# Patient Record
Sex: FEMALE | Race: WHITE
[De-identification: names, ages, dates, MRNs, and addresses within clinical notes are randomized per-mention and may not be internally consistent; named-entity substitution may affect disease eponyms.]

---

## 2016-12-17 VITALS
DIASTOLIC BLOOD PRESSURE: 57 MMHG | DIASTOLIC BLOOD PRESSURE: 57 MMHG | DIASTOLIC BLOOD PRESSURE: 57 MMHG | SYSTOLIC BLOOD PRESSURE: 111 MMHG | SYSTOLIC BLOOD PRESSURE: 111 MMHG | SYSTOLIC BLOOD PRESSURE: 111 MMHG

## 2017-03-24 ENCOUNTER — HOSPITAL ENCOUNTER (OUTPATIENT)
Dept: HOSPITAL 63 - MAMMO | Age: 57
Discharge: HOME | End: 2017-03-24
Payer: COMMERCIAL

## 2017-03-24 DIAGNOSIS — Z12.31: Primary | ICD-10-CM

## 2017-03-24 DIAGNOSIS — Z01.419: ICD-10-CM

## 2017-03-24 PROCEDURE — 77063 BREAST TOMOSYNTHESIS BI: CPT

## 2017-03-24 NOTE — RAD
DATE: 3/24/2017



EXAM: MAMMO UVALDO SCREENING BILATERAL



HISTORY: Screening. The history of breast reduction surgery is noted



COMPARISON: Digitized film screening examination from 4 years earlier



This study was interpreted with the benefit of Computerized Aided Detection

(CAD).



FINDINGS:



The breast parenchyma shows scattered fibroglandular densities. Breast

parenchyma level B. 



 There has not been a significant change in the appearance of the breasts.

Occasional benign-appearing calcifications are noted.  







IMPRESSION: Benign findings







BI-RADS CATEGORY: 2 BENIGN FINDING(S)



RECOMMENDED FOLLOW-UP: 12M 12 MONTH FOLLOW-UP



PQRS compliance statement: Patient information was entered into a reminder

system with a target due date 3/24/2018 for the next mammogram.



Mammography is a sensitive method for finding small breast cancers, but it

does not detect them all and is not a substitute for careful clinical

examination.  A negative mammogram does not negate a clinically suspicious

finding and should not result in delay in biopsying a clinically suspicious

abnormality.



"Our facility is accredited by the American College of [employer] Mammography

Program."

## 2017-06-14 ENCOUNTER — HOSPITAL ENCOUNTER (OUTPATIENT)
Dept: HOSPITAL 63 - DXRADRC | Age: 57
Discharge: HOME | End: 2017-06-14
Attending: PHYSICIAN ASSISTANT
Payer: COMMERCIAL

## 2017-06-14 DIAGNOSIS — M79.644: Primary | ICD-10-CM

## 2017-06-14 DIAGNOSIS — M85.841: ICD-10-CM

## 2017-06-14 DIAGNOSIS — M79.89: ICD-10-CM

## 2017-06-14 PROCEDURE — 73140 X-RAY EXAM OF FINGER(S): CPT

## 2017-10-06 ENCOUNTER — HOSPITAL ENCOUNTER (OUTPATIENT)
Dept: HOSPITAL 63 - DXRAD | Age: 57
Discharge: HOME | End: 2017-10-06
Payer: COMMERCIAL

## 2017-10-06 DIAGNOSIS — M05.79: Primary | ICD-10-CM

## 2017-10-06 PROCEDURE — 73630 X-RAY EXAM OF FOOT: CPT

## 2017-10-06 PROCEDURE — 73130 X-RAY EXAM OF HAND: CPT

## 2017-10-06 NOTE — RAD
FOOT BILAT 3V



Clinical Indication: RHEUMATOID ARTHRITIS



Comparison: None.



Findings: 



Right foot: No acute fracture or malalignment. Os perineum. Calcaneal

enthesophyte. The joint spaces are maintained. Bony mineralization is normal

for the patient's age. No significant soft tissue abnormality. No radiopaque

foreign body.



Left foot: No acute fracture or malalignment. Os naviculare versus remote

injury. Os perineum. The joint spaces are maintained. Bony mineralization is

normal for the patient's age. No significant soft tissue abnormality. No

radiopaque foreign body.



IMPRESSION:

No acute fracture or malalignment. The joint spaces are maintained.

## 2017-10-06 NOTE — RAD
HAND BILAT 3V



Clinical Indication: RHEUMATOID ARTHRITIS



Comparison: None.



Findings: 

No acute fracture or malalignment. The joint spaces are maintained. Bony

mineralization is normal for the patient's age. No significant soft tissue

abnormality. No radiopaque foreign body.



IMPRESSION:

No acute fracture or malalignment. The joint spaces are maintained.

## 2017-12-22 NOTE — RAD
Indication: Chest congestion.



Time of exam 1524 hours per



Correlation is made with prior study from 2/1/2017.



Previously noted parenchymal density in the right base has largely resolved.

There is some mild residual density present right mid to lower lung field. The

left lung is clear. No effusion or pneumothorax is seen.



Impression: Residual versus new infiltrate or atelectasis right mid to lower

lung field when compared with exam from 2/1/2017.

## 2018-03-30 ENCOUNTER — HOSPITAL ENCOUNTER (OUTPATIENT)
Dept: HOSPITAL 61 - ENDOS | Age: 58
Discharge: HOME | End: 2018-03-30
Attending: INTERNAL MEDICINE
Payer: COMMERCIAL

## 2018-03-30 VITALS
SYSTOLIC BLOOD PRESSURE: 120 MMHG | DIASTOLIC BLOOD PRESSURE: 62 MMHG | DIASTOLIC BLOOD PRESSURE: 62 MMHG | SYSTOLIC BLOOD PRESSURE: 120 MMHG | DIASTOLIC BLOOD PRESSURE: 62 MMHG | SYSTOLIC BLOOD PRESSURE: 120 MMHG

## 2018-03-30 DIAGNOSIS — Z98.51: ICD-10-CM

## 2018-03-30 DIAGNOSIS — Z98.890: ICD-10-CM

## 2018-03-30 DIAGNOSIS — K57.30: ICD-10-CM

## 2018-03-30 DIAGNOSIS — Z79.899: ICD-10-CM

## 2018-03-30 DIAGNOSIS — Z82.49: ICD-10-CM

## 2018-03-30 DIAGNOSIS — J45.909: ICD-10-CM

## 2018-03-30 DIAGNOSIS — K64.0: ICD-10-CM

## 2018-03-30 DIAGNOSIS — Z12.11: Primary | ICD-10-CM

## 2018-03-30 DIAGNOSIS — E03.9: ICD-10-CM

## 2018-03-30 DIAGNOSIS — K21.9: ICD-10-CM

## 2018-03-30 DIAGNOSIS — Z83.3: ICD-10-CM

## 2018-03-30 PROCEDURE — 45378 DIAGNOSTIC COLONOSCOPY: CPT

## 2018-03-30 RX ADMIN — SODIUM CHLORIDE, SODIUM LACTATE, POTASSIUM CHLORIDE, AND CALCIUM CHLORIDE 1 MLS/HR: .6; .31; .03; .02 INJECTION, SOLUTION INTRAVENOUS at 08:48

## 2018-06-12 NOTE — RAD
DATE: 6/12/2018



EXAM: MAMMO UVALDO SCREENING BILATERAL



HISTORY: Screening. The history of breast reduction surgery is noted



COMPARISON: Digitized film screening examination from 4 years earlier



This study was interpreted with the benefit of Computerized Aided Detection

(CAD).



FINDINGS:



The breast parenchyma shows scattered fibroglandular densities. Breast

parenchyma level B. 



 There has not been a significant change in the appearance of the breasts.

Occasional benign-appearing calcifications are noted.  No suspicious

calcifications, spiculated mass or area of architectural distortion.  The skin

and nipples are within normal limits.







IMPRESSION: Benign findings







BI-RADS CATEGORY: 2 BENIGN FINDING(S)



RECOMMENDED FOLLOW-UP: 12M 12 MONTH FOLLOW-UP



PQRS compliance statement: Patient information was entered into a reminder

system with a target due date     for the next mammogram.



Mammography is a sensitive method for finding small breast cancers, but it

does not detect them all and is not a substitute for careful clinical

examination.  A negative mammogram does not negate a clinically suspicious

finding and should not result in delay in biopsying a clinically suspicious

abnormality.



"Our facility is accredited by the American College of [employer] Mammography

Program."

## 2018-06-15 NOTE — RAD
Left foot ultrasound, 6/15/2018:



History: Lump between the third and fourth metatarsals



The area of clinical concern in the left metatarsal region was carefully

scanned.  There is a predominantly hypoechoic, mildly heterogeneous process

with internal vascularity present between the third and fourth metatarsals in

the area of clinical concern.  It can be seen with both plantar and anterior

scanning.  It is asymmetric compared to the right foot or the other

intermetatarsal regions.  In the axial plane it measures approximately 12 mm

in greatest width.  It is difficult to measure in the other planes.





IMPRESSION: Solid-appearing mass between the third and fourth metatarsals as

described above.  The findings are nonspecific, however, the location raises

possibility of a Hawkins's neuroma.  A neoplastic etiology cannot be excluded. 

MR scanning is suggested for further evaluation.

## 2018-07-19 ENCOUNTER — HOSPITAL ENCOUNTER (OUTPATIENT)
Dept: HOSPITAL 61 - KCIC MRI | Age: 58
Discharge: HOME | End: 2018-07-19
Attending: PODIATRIST
Payer: COMMERCIAL

## 2018-07-19 DIAGNOSIS — J45.909: ICD-10-CM

## 2018-07-19 DIAGNOSIS — K21.9: ICD-10-CM

## 2018-07-19 DIAGNOSIS — E03.9: ICD-10-CM

## 2018-07-19 DIAGNOSIS — R22.42: Primary | ICD-10-CM

## 2018-07-19 PROCEDURE — 73718 MRI LOWER EXTREMITY W/O DYE: CPT

## 2018-11-04 NOTE — PHYS DOC
Adult General


Chief Complaint


Chief Complaint


cough





HPI


HPI





This is a very pleasant 57 years old female with history of COPD presented in 

the emergency department with shortness of breath on exertion low grade 

temperature started yesterday .


No chest pain or abdominal pain urgency frequency or hematuria





Review of Systems


Review of Systems





Constitutional: + fever


Eyes: Denies change in visual acuity, redness, or eye pain []


HENT: Denies nasal congestion or sore throat []


Respiratory:+ cough or shortness of breath []


Cardiovascular: No additional information not addressed in HPI []


GI: Denies abdominal pain, nausea, vomiting, bloody stools or diarrhea []


: Denies dysuria or hematuria []


Musculoskeletal: Denies back pain or joint pain []


Integument: Denies rash or skin lesions []


Neurologic: Denies headache, focal weakness or sensory changes []


Endocrine: Denies polyuria or polydipsia []





All other systems were reviewed and found to be within normal limits, except as 

documented in this note.





Current Medications


Current Medications





Current Medications








 Medications


  (Trade)  Dose


 Ordered  Sig/Derrick  Start Time


 Stop Time Status Last Admin


Dose Admin


 


 Albuterol Sulfate


  (Ventolin)  2.5 mg  1X  ONCE  11/4/18 20:45


 11/4/18 20:46 UNV  





 


 Ceftriaxone Sodium


  (Rocephin Im)  1 gm  1X  ONCE  11/4/18 20:45


 11/4/18 20:46 UNV  





 


 Prednisone


  (Prednisone)  60 mg  1X  ONCE  11/4/18 20:45


 11/4/18 20:46 UNV  














Allergies


Allergies





Allergies








Coded Allergies Type Severity Reaction Last Updated Verified


 


  No Known Drug Allergies    12/17/16 No











Physical Exam


Physical Exam





Constitutional: Well developed, well nourished, no acute distress, non-toxic 

appearance. []


HENT: Normocephalic, atraumatic, bilateral external ears normal, oropharynx 

moist, no oral exudates, nose normal. []


Eyes: PERRLA, EOMI, conjunctiva normal, no discharge. [] 


Neck: Normal range of motion, no tenderness, supple, no stridor. [] 


Cardiovascular:Heart rate regular rhythm, no murmur []


Lungs & Thorax: Diminished wheezing no crackles]


Abdomen: Bowel sounds normal, soft, no tenderness, no masses, no pulsatile 

masses. [] 


Skin: Warm, dry, no erythema, no rash. [] 


Back: No tenderness, no CVA tenderness. [] 


Extremities: No tenderness, no cyanosis, no clubbing, ROM intact, no edema. [] 


Neurologic: Alert and oriented X 3, normal motor function, normal sensory 

function, no focal deficits noted. []


Psychologic: Affect normal, judgement normal, mood normal. []





Current Patient Data


Vital Signs





 Vital Signs








  Date Time  Temp Pulse Resp B/P (MAP) Pulse Ox O2 Delivery O2 Flow Rate FiO2


 


11/4/18 20:20 103.0 109 18  91 Room Air  











EKG


EKG


[]





Radiology/Procedures


Radiology/Procedures


[]





Course & Med Decision Making


Course & Med Decision Making


Pertinent Labs and Imaging studies reviewed. (See chart for details)





[]





Final Impression


Final Impression


COPD exacerbation


Patient will be on prednisone, Augmentin, inhalers[]


Problems:  


(1) COPD exacerbation





Dragon Disclaimer


Dragon Disclaimer


This electronic medical record was generated, in whole or in part, using a 

voice recognition dictation system.











PATSY RODRIGUEZ MD Nov 4, 2018 20:38

## 2019-03-06 ENCOUNTER — HOSPITAL ENCOUNTER (OUTPATIENT)
Dept: HOSPITAL 61 - PF | Age: 59
Discharge: HOME | End: 2019-03-06
Attending: SURGERY
Payer: COMMERCIAL

## 2019-03-06 DIAGNOSIS — J44.9: Primary | ICD-10-CM

## 2019-03-06 DIAGNOSIS — F17.210: ICD-10-CM

## 2019-03-06 PROCEDURE — 94640 AIRWAY INHALATION TREATMENT: CPT

## 2019-03-06 PROCEDURE — 94060 EVALUATION OF WHEEZING: CPT

## 2019-10-15 NOTE — RAD
Examination: HAND BILAT 3V

 

History: Polyarthralgia

 

Comparison/Correlation: None

 

Findings: A total of 3 images of the right hand and a total of 3 images of

the left hand were provided.

 

Joint spaces are unremarkable. No fracture or bone destruction. Soft 

tissues are unremarkable. No bony erosions identified. Soft tissues are 

grossly unremarkable. Bony mineralization is adequate.

 

Impression:

No significant degenerative change or other suspicious process.

 

Electronically signed by: Jhon Rick MD (10/15/2019 8:29 AM) [facility]

## 2020-03-04 NOTE — RAD
FOOT LEFT 3V

 

History: Soft tissue mass at the plantar foot..

 

Comparison study: None

 

FINDINGS:

Small lucency at the margin of the distal medial aspect of the proximal 

first phalanx. Nonspecific but likely a small erosion. No evidence of 

acute fracture. No aggressive appearing bone destruction. Joint spaces and

alignment appear intact. Small calcaneal enthesophyte. No significant soft

tissue abnormality is seen.

 

IMPRESSION:

1. Small eccentric lucency at the distal medial aspect of the proximal 

first phalanx. This is nonspecific, could represent an erosion or sequela 

of gout. This does not have an aggressive appearance. Depending on 

clinical concern, could be further evaluated with radiographic follow-up 

in several months.

2. No evidence of a soft tissue mass although could be difficult to 

detect. Consider ultrasound or MR for further evaluation, as indicated.

 

Electronically signed by: Avery Jorge MD (3/4/2020 4:27 PM) [id number]

## 2020-03-09 NOTE — RAD
BILATERAL SCREENING MAMMOGRAM, 3-D

 

History: Routine screening.

 

Comparison:  3/27/2013, 3/24/2017, 6/8/2018 mammographic exams. 

 

Technique:  MLO and CC digital tomosynthesis (3D) images obtained. 

Radiologist reviewed these images on dedicated workstation.

 

Findings: 

Breast Tissue Density B : There are scattered areas of fibroglandular 

density.

 

There are no dominant masses, suspicious microcalcifications, or 

architectural distortion.  Benign calcifications are present.

 

IMPRESSION:

No mammographic evidence of malignancy. Recommend routine screening.

 

BI-RADS category 1:  Negative.

 

The images were reviewed with computer-aided detection.

 

Patient information is entered into reminder system with a target due date

for the next screening mammogram.

 

Mammography is the most sensitive method for finding small breast cancers,

but it does not detect them all and is not a substitute for careful 

clinical examination. A negative mammogram does not negate a clinically 

suspicious finding and should not result in delay in biopsying a 

clinically suspicious abnormality.

 

 "Our facility is accredited by the American College of [employer] 

Mammography Program."

 

Electronically signed by: Jhon Rick MD (3/9/2020 6:02 PM) [id number]

## 2020-03-11 ENCOUNTER — HOSPITAL ENCOUNTER (OUTPATIENT)
Dept: HOSPITAL 61 - KCIC MRI | Age: 60
Discharge: HOME | End: 2020-03-11
Attending: PODIATRIST
Payer: COMMERCIAL

## 2020-03-11 DIAGNOSIS — Z98.890: ICD-10-CM

## 2020-03-11 DIAGNOSIS — M79.89: Primary | ICD-10-CM

## 2020-03-11 PROCEDURE — 73718 MRI LOWER EXTREMITY W/O DYE: CPT

## 2020-03-11 NOTE — KCIC
MR of the left forefoot

 

HISTORY: Soft tissue mass. Recent surgery for rheumatoid nodule between 

the third and fourth toes. Pain with weightbearing for 3 months.

 

TECHNIQUE: Surface marker was placed at the area of concern. Routine 

multiplanar sequences are obtained through the distal foot.

 

FINDINGS:

Surface marker is located plantar to the base of the second toe, where 

there is focal area of ill-defined nodularity measuring 12 mm. This 

demonstrates replacement of the normal fatty signal with intermediate T1 

signal. Less well-defined on T2-weighted images, with mostly isointense 

signal with some milder edema type appearance. This is superficial to the 

flexor tendon. No evidence of fluid collection or cyst.

 

No significant tendon sheath fluid. The tendons are intact. Lisfranc 

ligament complex and tarsometatarsal alignment are intact.

 

No evidence of acute fracture. No aggressive bone destruction. No 

significant joint effusion.

 

IMPRESSION: Area of ill-defined nodularity plantar to the base of the 

second toe corresponding with the palpable nodule. In correlation with the

history, this may represent another rheumatoid nodule. However, the 

appearance itself is nonspecific and this could represent other etiology 

or an area of induration or fibrosis.

 

Electronically signed by: Avery Jorge MD (3/11/2020 12:23 PM) [id number]

## 2020-06-15 ENCOUNTER — HOSPITAL ENCOUNTER (OUTPATIENT)
Dept: HOSPITAL 61 - LAB | Age: 60
Discharge: HOME | End: 2020-06-15
Attending: PODIATRIST
Payer: COMMERCIAL

## 2020-06-15 DIAGNOSIS — Z11.59: Primary | ICD-10-CM

## 2020-06-19 ENCOUNTER — HOSPITAL ENCOUNTER (OUTPATIENT)
Dept: HOSPITAL 61 - SURG | Age: 60
Discharge: HOME | End: 2020-06-19
Attending: PODIATRIST
Payer: COMMERCIAL

## 2020-06-19 VITALS — HEIGHT: 67 IN | BODY MASS INDEX: 25.11 KG/M2 | WEIGHT: 159.99 LBS

## 2020-06-19 VITALS — SYSTOLIC BLOOD PRESSURE: 136 MMHG | DIASTOLIC BLOOD PRESSURE: 81 MMHG

## 2020-06-19 DIAGNOSIS — Z98.51: ICD-10-CM

## 2020-06-19 DIAGNOSIS — E03.9: ICD-10-CM

## 2020-06-19 DIAGNOSIS — Z90.721: ICD-10-CM

## 2020-06-19 DIAGNOSIS — M06.30: Primary | ICD-10-CM

## 2020-06-19 DIAGNOSIS — F41.9: ICD-10-CM

## 2020-06-19 DIAGNOSIS — Z87.891: ICD-10-CM

## 2020-06-19 DIAGNOSIS — K44.9: ICD-10-CM

## 2020-06-19 DIAGNOSIS — Z90.710: ICD-10-CM

## 2020-06-19 DIAGNOSIS — Z87.01: ICD-10-CM

## 2020-06-19 DIAGNOSIS — K21.9: ICD-10-CM

## 2020-06-19 DIAGNOSIS — J45.909: ICD-10-CM

## 2020-06-19 LAB
ALBUMIN SERPL-MCNC: 3.3 G/DL (ref 3.4–5)
ALBUMIN/GLOB SERPL: 0.8 {RATIO} (ref 1–1.7)
ALP SERPL-CCNC: 97 U/L (ref 46–116)
ALT SERPL-CCNC: 29 U/L (ref 14–59)
ANION GAP SERPL CALC-SCNC: 8 MMOL/L (ref 6–14)
AST SERPL-CCNC: 25 U/L (ref 15–37)
BASOPHILS # BLD AUTO: 0.1 X10^3/UL (ref 0–0.2)
BASOPHILS NFR BLD: 1 % (ref 0–3)
BILIRUB SERPL-MCNC: 0.1 MG/DL (ref 0.2–1)
BUN SERPL-MCNC: 12 MG/DL (ref 7–20)
BUN/CREAT SERPL: 15 (ref 6–20)
CALCIUM SERPL-MCNC: 8.8 MG/DL (ref 8.5–10.1)
CHLORIDE SERPL-SCNC: 104 MMOL/L (ref 98–107)
CO2 SERPL-SCNC: 29 MMOL/L (ref 21–32)
CREAT SERPL-MCNC: 0.8 MG/DL (ref 0.6–1)
EOSINOPHIL NFR BLD: 0.1 X10^3/UL (ref 0–0.7)
EOSINOPHIL NFR BLD: 3 % (ref 0–3)
ERYTHROCYTE [DISTWIDTH] IN BLOOD BY AUTOMATED COUNT: 13.7 % (ref 11.5–14.5)
GFR SERPLBLD BASED ON 1.73 SQ M-ARVRAT: 73.4 ML/MIN
GLOBULIN SER-MCNC: 4 G/DL (ref 2.2–3.8)
GLUCOSE SERPL-MCNC: 91 MG/DL (ref 70–99)
HCT VFR BLD CALC: 42.9 % (ref 36–47)
HGB BLD-MCNC: 14.4 G/DL (ref 12–15.5)
LYMPHOCYTES # BLD: 1.2 X10^3/UL (ref 1–4.8)
LYMPHOCYTES NFR BLD AUTO: 27 % (ref 24–48)
MCH RBC QN AUTO: 30 PG (ref 25–35)
MCHC RBC AUTO-ENTMCNC: 33 G/DL (ref 31–37)
MCV RBC AUTO: 91 FL (ref 79–100)
MONO #: 0.5 X10^3/UL (ref 0–1.1)
MONOCYTES NFR BLD: 10 % (ref 0–9)
NEUT #: 2.8 X10^3/UL (ref 1.8–7.7)
NEUTROPHILS NFR BLD AUTO: 60 % (ref 31–73)
PLATELET # BLD AUTO: 298 X10^3/UL (ref 140–400)
POTASSIUM SERPL-SCNC: 4.1 MMOL/L (ref 3.5–5.1)
PROT SERPL-MCNC: 7.3 G/DL (ref 6.4–8.2)
RBC # BLD AUTO: 4.74 X10^6/UL (ref 3.5–5.4)
SODIUM SERPL-SCNC: 141 MMOL/L (ref 136–145)
WBC # BLD AUTO: 4.6 X10^3/UL (ref 4–11)

## 2020-06-19 PROCEDURE — 36415 COLL VENOUS BLD VENIPUNCTURE: CPT

## 2020-06-19 PROCEDURE — A7015 AEROSOL MASK USED W NEBULIZE: HCPCS

## 2020-06-19 PROCEDURE — 87075 CULTR BACTERIA EXCEPT BLOOD: CPT

## 2020-06-19 PROCEDURE — 85025 COMPLETE CBC W/AUTO DIFF WBC: CPT

## 2020-06-19 PROCEDURE — 28039 EXC FOOT/TOE TUM SC 1.5 CM/>: CPT

## 2020-06-19 PROCEDURE — 88307 TISSUE EXAM BY PATHOLOGIST: CPT

## 2020-06-19 PROCEDURE — 88312 SPECIAL STAINS GROUP 1: CPT

## 2020-06-19 PROCEDURE — 80053 COMPREHEN METABOLIC PANEL: CPT

## 2020-06-19 PROCEDURE — 87071 CULTURE AEROBIC QUANT OTHER: CPT

## 2020-06-19 NOTE — PDOC4
OPERATIVE NOTE:


Surgeon: Jose


Pre operative diagnosis: soft tissue mass plantar 2nd Metatarsal head left foot,

rheumatoid nodule


Post operative diagnosis: same


Procedure: Excision of soft tissue mass subcutaneous tissue left foot


Anesthesia: LMA with local left foot


Hemostasis: Left ankle tourniquet at 250mmHg x 25 minutes


EBL 1mL 


Materials 3-0 vicryl, 3-0 nylon


Specimen: Sent to pathology white well circumscribed fibrotic firm soft tissue 

mass 2x1.5cm, wound culture operative site aerobic/anaerobic


Intraoperative findings: note soft tissue mass plantar left foot 2nd metatarsal 

in the subcutaneous tissue. 


Patient tolerated both anesthesia and procedure well transferred to PACU with 

VSS and VSI to left foot











MARY HOPPER DPM         Jun 19, 2020 12:24

## 2020-06-19 NOTE — OP
DATE OF SURGERY:  06/19/2020



PREOPERATIVE DIAGNOSES:  Soft tissue mass, plantar second metatarsal head, left

foot; presumably a rheumatoid nodule.



POSTOPERATIVE DIAGNOSES:  Soft tissue mass, plantar second metatarsal head, left

foot; presumably a rheumatoid nodule.



PROCEDURE:  Excision of soft tissue mass, subcutaneous tissues to the left foot.



SURGEON:  Geri Garcia DPM



ANESTHESIA:  LMA with local, left foot.



HEMOSTASIS:  Left ankle tourniquet at 250 mmHg x 25 minutes.



ESTIMATED BLOOD LOSS:  1 mL.



INDICATIONS:  The patient is a 59-year-old female with history of previous

excision of rheumatoid nodule, now with new nodule formation sub-second

metatarsal head, left foot, started small and continued to grow.  MRI showed a

circumscribed mass, which was suspicious of another rheumatoid nodule.  She

tried conservative treatment of supportive shoe gear and accommodative padding,

and as the mass grew larger, she called to schedule Surgery for excision. 

Discussed with the patient the possible risks, benefits, complications to

include delayed or nonhealing, need for further surgery, infection, damage to

nerve or blood vessel, floppy toe, flail toe, shortened toe, lack of toe

purchase, recurrence, DVT, pulmonary embolism.  All questions were answered.  No

guarantees were made.



DESCRIPTION OF PROCEDURE:  The patient was transported to the operating room via

a cart and placed on the operating table in the supine position.  Final

verification of the surgery, the patient limb was performed, timeout was taken. 

A well-padded tourniquet was placed over the left ankle and a second ray block

and a local infiltrative block was given with a 1:1 mixture of 0.5% Marcaine and

1% lidocaine plain.  The left foot was then prepped and draped in the usual

aseptic manner.  [place] exsanguination was used and the left ankle tourniquet

was inflated to 250 mmHg.  A 4 cm incision was made plantar second metatarsal. 

This was deepened to the level of the adipose tissue, noted a white glistening

circular soft tissue mass protruding and this was dissected out in toto and sent

to pathology.  It was measured to be 2 x 1.5 upon removal.  Obtained a wound

culture, aerobic and anaerobic at this time.  The wound was copiously irrigated

with sterile saline and the skin was reapproximated with 3-0 Vicryl and 3-0

nylon.  A Betadine ointment, Adaptic gauze, 4 x 4s, Kerlix and Ace bandage was

then applied.  The tourniquet was deflated and good perfusion was noted to all

digits of the left foot.  The patient tolerated both anesthesia and procedure

well and was transported to PACU with vital signs stable and vascular status

intact to the left foot.

 



______________________________

GERI GARCIA DPM



DR:  CRISTINA/amador  JOB#:  [id number] / [id number]

DD:  06/19/2020 12:30  DT:  06/19/2020 13:37

## 2020-06-19 NOTE — SSS
ADMIT DATE:  06/19/2020



CHIEF COMPLAINT:  Left foot bunion/nodule.



HISTORY OF PRESENT ILLNESS:  The patient is a pleasant middle-aged female who

has a left foot bunion/nodule.  It has been painful.  It bounces back and forth

when she walks.  She says it bothers her.  She saw Dr. Garcia and Dr. Garcia has agreed to surgically remove the lesion today.  We have been

requested for preoperative evaluation.  The patient is currently being examined

in the preoperative area.



PAST MEDICAL HISTORY:  Rheumatoid arthritis, anxiety, asthma, and

hypothyroidism.



ALLERGIES:  None.



FAMILY HISTORY:  Noncontributory.



SOCIAL HISTORY:  She does not drink, smoke, or take drugs.  She is retired.



MEDICATIONS:  Reviewed, please refer to the MRAD.



REVIEW OF SYSTEMS:

GENERAL:  No history of weight change, weakness or fevers.

SKIN:  No bruising, hair changes or rashes.

EYES:  No blurred, double or loss of vision.

NOSE AND THROAT:  No history of nosebleeds, hoarseness or sore throat.

HEART:  No history of palpitations, chest pain or shortness of breath on

exertion.

LUNGS:  Denies cough, hemoptysis, wheezing or shortness of breath.

GASTROINTESTINAL:  Denies changes in appetite, nausea, vomiting, diarrhea or

constipation.

GENITOURINARY:  No history of frequency, urgency, hesitancy or nocturia.

NEUROLOGIC:  Denies history of numbness, tingling, tremor or weakness.

PSYCHIATRIC:  No history of panic, anxiety or depression.

ENDOCRINE:  No history of heat or cold intolerance, polyuria or polydipsia.

EXTREMITIES:  She complains of left foot pain.



PHYSICAL EXAMINATION:

VITALS:  Within normal limits and are stable.

GENERAL:  No apparent distress.  Alert and oriented.

HEENT:  Normal cephalic atraumatic, external auditory canals are patent

EYES:  Extraocular muscles are intact, pupils are equally round and reactive to

light and accommodation

MUSCULOSKELETAL:  Well developed, well nourished, good range of motion

ENDOCRINE:  No thyromegaly was palpated

LYMPHATICS:  No cervical chain or axillary nodes were noted

HEMATOPOIETIC:  No bruising

NECK:  Supple, no JVD, no thyromegaly was noted.

LUNGS:  Clear to auscultation in all lung fields without rhonchi or wheezing.

HEART:  RRR, S1, S2 present.  Peripheral pulses intact, no obvious murmurs were

noted.

ABDOMEN:  Soft, nontender.  Positive bowel sounds no organomegaly, normal bowel

sounds.

EXTREMITIES:  The left foot does have a lesion on the plantar surface.

NEUROLOGIC:  Normal speech, normal tone.  A & O x3, moves all extremities, no

obvious focal deficits.

PSYCHIATRIC:  Normal affect, normal mood.  Stable.

SKIN:  No ulcerations or rashes, good skin turgor, no jaundice.

VASCULAR:  Good capillary refill, neurovascular bundle appears to be intact.



ASSESSMENT AND PLAN:  Left foot lesion.  Clinically, she looks great, cleared

for surgery.  Postoperatively, if for some reason she needs to be admitted, I

can help with that.



Thank you very much for allowing us to participate in the care of this nice

lady.

 



______________________________

MYLA JOSEPH DO



DR:  [place]/amador  JOB#:  [id number] / [id number]

DD:  06/19/2020 10:23  DT:  06/19/2020 10:42



MARY Lau DPM

## 2020-06-23 NOTE — PATHOLOGY
[facility] Accession Number: [id number]

.                                                                01

Material submitted:                                        .

foot - SOFT TISSUE MASS LEFT FOOT. Modifiers: left

.                                                                01

Clinical history:                                          .

Rheumatoid nodule

.                                                                02

**********************************************************************

Diagnosis:

Fibroadipose tissue, left foot soft tissue mass excision:

- Necrotizing granulomas consistent with rheumatoid nodules.

(JPM:pit 06/22/2020)

[facility]  06/22/2020  1617 Local

**********************************************************************

.                                                                02

Comment:

Sections of the left foot soft tissue mass excision reveal necrotizing

granulomas.  The granulomas show irregular geographic areas of central

necrosis containing fibrin and relatively small numbers of degenerating

inflammatory cells.  The areas of necrosis are bordered by a

palisaded layer of histiocytes.  The differential diagnosis includes

infectious granulomas and rheumatoid nodules.  Properly controlled

stains for acid-fast bacilli and fungi are obtained on block A2 and yield

the following results:

.

AFB stain (A2):  Negative for acid-fast bacilli

GMS for yeast/fungus (A2):  Negative for yeast/fungi

.

The morphologic and special stain findings are consistent with rheumatoid

nodules.  Correlate clinically.

([facility] 06/22/2020)

.

Special stains performed:  AFB stain and GMS for yeast/fungus stain on A2

.                                                                02

Electronically signed:                                     .

Ben Dallas MD, Pathologist

NPI- [id number]

.                                                                01

Gross description:                                         .

The specimen is received in formalin, labeled "Little, Mariah, soft tissue

mass left foot" and consists of a pink-tan rubbery segment of tissue

measuring 1.8 x 1.3 x 0.6 cm.  It is serially sectioned and entirely

submitted in A1-A2.

(SDY; 6/19/2020)

SYU/SYU  06/22/2020  0830 Local

.                                                                02

Pathologist provided ICD-10:

L92.9

.                                                                02

CPT                                                        .

745044, [id number], [id number]

Specimen Comment: A courtesy copy of this report has been sent to [phone], [phone]

Specimen Comment: [id number]

Specimen Comment: Report sent to  / DR BAL

***Performed at:  01

   [facility], [place]  [id number]

   MD Damien Morris MD Phone:  [phone]

***Performed at:  02

   [facility]

   [address] [place], [place]  [id number]

   MD Ben Dallas MD Phone:  [phone]

## 2021-03-12 NOTE — RAD
EXAM: Chest, 2 views.



HISTORY: Cough and smoking.



COMPARISON: 12/22/2017



FINDINGS: 2 views of the chest are obtained. There is no infiltrate, pleural effusion or pneumothorax
. There are mediastinal and left upper lobe clips. The heart is stable in size. There is hyperinflati
on due to inspiratory effort or emphysema.



IMPRESSION: No acute pulmonary finding.



Electronically signed by: Ronel Del Cid MD (3/12/2021 11:06 AM) [id number]

## 2021-05-29 NOTE — PHYS DOC
Past History


Past Medical History:  Arthritis, Asthma, COPD, GERD, Hypothyroid, Pneumonia


Past Surgical History:  , Hysterectomy, Other


Alcohol Use:  None


Drug Use:  None





General Adult


EDM:


Chief Complaint:  SHORTNESS OF BREATH





HPI:


HPI:


60-year-old female presents with shortness of breath and chest tightness.  

Patient has history of COPD.  She feels like she is having a little bit of chest

discomfort with deep breathing.  She believes it is from coughing so much 

lately.  She has seen her primary care physician who put her on steroids.  She 

is already been on them for for 5 days.  She is still feeling a bit short of 

breath.  She checked her oxygen at home and her home O2 showed 75.  She was 

concerned that this might mean she has pneumonia.  She typically runs around 90.

 She is not on home oxygen.  She continues to smoke.  No history of cardiac 

disease.  She has been taking her COPD medicines as prescribed.  She took a 

breathing treatment prior to arrival and she says that this made her feel a bit 

better.





Review of Systems:


Review of Systems:


Constitutional:  Denies fever or chills 


Eyes:  Denies change in visual acuity 


HENT:  Denies nasal congestion or sore throat 


Respiratory: Cough with shortness of breath 


Cardiovascular: Chest pain


GI:  Denies abdominal pain, nausea, vomiting, bloody stools or diarrhea 


: Denies dysuria 


Musculoskeletal:  Denies back pain or joint pain 


Integument:  Denies rash 


Neurologic:  Denies headache, focal weakness or sensory changes 


Endocrine:  Denies polyuria or polydipsia 


Lymphatic:  Denies swollen glands 


Psychiatric:  Denies depression or anxiety





Allergies:


Allergies:





Allergies








Coded Allergies Type Severity Reaction Last Updated Verified


 


  No Known Drug Allergies    16 No











Physical Exam:


PE:





Constitutional: Well developed, well nourished, no acute distress, non-toxic 

appearance. []


HENT: Normocephalic, atraumatic, bilateral external ears normal, oropharynx 

moist, no oral exudates, nose normal. []


Eyes: PERRLA, EOMI, conjunctiva normal, no discharge. [] 


Neck: Normal range of motion, no tenderness, supple, no stridor. [] 


Cardiovascular: Heart rate regular rhythm, no murmur []


Lungs & Thorax:  Bilateral breath sounds diminished with mild end expiratory 

wheeze at the right base []


Abdomen: Bowel sounds normal, soft, no tenderness, no masses, no pulsatile 

masses. [] 


Skin: Warm, dry, no erythema, no rash. [] 


Back: No tenderness, no CVA tenderness. [] 


Extremities: No tenderness, no cyanosis, no clubbing, ROM intact, no edema. [] 


Neurologic: Alert and oriented X 3, normal motor function, normal sensory 

function, no focal deficits noted. []


Psychologic: Affect normal, judgement normal, mood normal. []





EKG:


EKG:


[]





Radiology/Procedures:


Radiology/Procedures:


[]





Heart Score:


C/O Chest Pain:  Yes


HEART Score for Chest Pain:  








HEART Score for Chest Pain Response (Comments) Value


 


History Slighlty/Non-Suspicious 0


 


ECG Normal 0


 


Age >45 - < 65 1


 


Risk Factors                            1 or 2 Risk Factors 1


 


Troponin < Normal Limit 0


 


Total  2








Risk Factors:


Risk Factors:  DM, Current or recent (<one month) smoker, HTN, HLP, family 

history of CAD, obesity.


Risk Scores:


Score 0 - 3:  2.5% MACE over next 6 weeks - Discharge Home


Score 4 - 6:  20.3% MACE over next 6 weeks - Admit for Clinical Observation


Score 7 - 10:  72.7% MACE over next 6 weeks - Early Invasive Strategies





Course & Med Decision Making:


Course & Med Decision Making


Pertinent Labs and Imaging studies reviewed. (See chart for details)


The patient's labs are unremarkable except for slightly elevated sodium and a 

slightly low potassium.  I have given her a DuoNeb treatment.  EKG is 

unremarkable.  Troponin is negative.  Her chest x-ray is negative for pneumonia.

  I think the patient is already on appropriate treatment.  I have advised her 

to continue her therapy as prescribed.  She is stable for discharge at this 

time.


[]





Dragon Disclaimer:


Dragon Disclaimer:


This electronic medical record was generated, in whole or in part, using a voice

 recognition dictation system.





Departure


Departure:


Impression:  


   Primary Impression:  


   COPD exacerbation


Disposition:   HOME / SELF CARE / HOMELESS


Condition:  STABLE


Referrals:  


JUHI BAL (PCP)


Patient Instructions:  Chronic Obstructive Pulmonary Disease Exacerbation, 

Easy-to-Read











GIL GIBBS DO                 May 29, 2021 08:28

## 2021-05-29 NOTE — RAD
XR CHEST 1V



CLINICAL INDICATIONS: Chest pain  



COMPARISON: March 12, 2021.



Findings: No acute lung infiltrate or pleural effusion or pulmonary edema or lung mass or pneumothora
x is seen.  The heart size, pulmonary vasculature, mediastinum and both karen are unremarkable. 



IMPRESSION: No acute radiographic abnormality is seen.



Electronically signed by: Mat Montes MD (5/29/2021 8:30 AM) [id number]

## 2021-05-29 NOTE — EKG
[facility] [address], [place] [address]

Test Date:    2021               Test Time:    08:18:11

Pat Name:     JULIENNE COHEN            Department:   

Patient ID:   [id number]           Room:          

Gender:       F                        Technician:   DESEAN

:          1960               Requested By: GIL GIBBS

Order Number: [id number]            Reading MD:     

                                 Measurements

Intervals                              Axis          

Rate:         72                       P:            90

AL:           162                      QRS:          9

QRSD:         88                       T:            48

QT:           396                                    

QTc:          435                                    

                           Interpretive Statements

SINUS RHYTHM

NORMAL ECG

RI6.02

No previous ECG available for comparison

## 2021-09-27 NOTE — RAD
Examination: CT chest without contrast



HISTORY: History of recurrent pneumonia



COMPARISON: None



TECHNIQUE: Axial CT images of the chest were performed without contrast. Coronal and sagittal reforma
ts are performed



Exposure: One or more of the following individualized dose reduction techniques were utilized for thi
s examination:  1. Automated exposure control  2. Adjustment of the mA and/or kV according to patient
 size  3. Use of iterative reconstruction technique



FINDINGS:



The central airways are patent. The heart size grossly appears unremarkable. Small mediastinal lymph 
nodes identified. There are faint airspace opacities identified in the bilateral upper lobes, left li
ngula likely infiltrates. No evidence of pleural effusion or pneumothorax. The visualized noncontrast
ed liver, spleen, adrenals grossly appears unremarkable



Mild degenerative changes thoracic spine. Lap band identified in the proximal stomach.



IMPRESSION:



1. Faint airspace opacities identified in the bilateral upper lobes and left lingula likely infiltrat
es/pneumonia. Follow-up to resolution.











Electronically signed by: Ezio Yang MD (9/27/2021 9:56 AM) [id number]

## 2021-11-28 NOTE — PHYS DOC
Past History


Past Medical History:  GERD, Hypothyroid


Past Surgical History:  , Hysterectomy


Additional Past Surgical Histo:  LAP BAND, BREAST REDUCTION


Alcohol Use:  None


Drug Use:  None





General Adult


EDM:


Chief Complaint:  SHORTNESS OF BREATH





HPI:


HPI:





Patient is a 60-year-old female with COPD coming in for shortness of breath.  

Patient was seen in urgent care 2 days ago and was diagnosed with viral 

pneumonia, was given a Z-Brent there.  Patient lives with her adult son who tested

positive for Covid 2 days ago, patient's test 2 days ago was negative.  Patient 

was vaccinated with her second dose of Moderna in August. Patient smokes tobacco

but has not had a cigarette in the past 5 days.





Review of Systems:


Review of Systems:


Constitutional:  Denies fever or chills 


Eyes:  Denies change in visual acuity 


HENT:  Denies nasal congestion or sore throat 


Respiratory:  Denies cough or shortness of breath 


Cardiovascular:  Denies chest pain or edema 


GI:  Denies abdominal pain, nausea, vomiting, bloody stools or diarrhea 


: Denies dysuria 


Musculoskeletal:  Denies back pain or joint pain 


Integument:  Denies rash 


Neurologic:  Denies headache, focal weakness or sensory changes 


Endocrine:  Denies polyuria or polydipsia 


Lymphatic:  Denies swollen glands 


Psychiatric:  Denies depression or anxiety





Allergies:


Allergies:





Allergies








Coded Allergies Type Severity Reaction Last Updated Verified


 


  No Known Drug Allergies    16 No











Physical Exam:


PE:





Constitutional: Well developed, well nourished, no acute distress, non-toxic 

appearance. []


HENT: Normocephalic, atraumatic, bilateral external ears normal, oropharynx 

moist, no oral exudates, nose normal. []


Eyes: PERRLA, EOMI, conjunctiva normal, no discharge. [] 


Neck: Normal range of motion, no tenderness, supple, no stridor. [] 


Cardiovascular:Heart rate regular rhythm, no murmur []


Lungs & Thorax:  Bilateral breath sounds clear to auscultation []


Abdomen: Bowel sounds normal, soft, no tenderness, no masses, no pulsatile 

masses. [] 


Skin: Warm, dry, no erythema, no rash. [] 


Back: No tenderness, no CVA tenderness. [] 


Extremities: No tenderness, no cyanosis, no clubbing, ROM intact, no edema. [] 


Neurologic: Alert and oriented X 3, normal motor function, normal sensory 

function, no focal deficits noted. []


Psychologic: Affect normal, judgement normal, mood normal. []





Current Patient Data:


Vital Signs:





                                   Vital Signs








  Date Time  Temp Pulse Resp B/P (MAP) Pulse Ox O2 Delivery O2 Flow Rate FiO2


 


21 06:40 101.6 98 22 135/75 (95) 93 Room Air  











EKG:


EKG:


[]





Radiology/Procedures:


Radiology/Procedures:


[facility] [address], [place] [address]


                                 [phone]


                                        


                                 IMAGING REPORT





                                     Signed





PATIENT: JULIENNE COHEN  ACCOUNT: [id number]     MRN#: [id number]


: 1960           LOCATION: ER              AGE: 60


SEX: F                    EXAM DT: 21         ACCESSION#: [id number]


STATUS: REG ER            ORD. PHYSICIAN: ANABEL PADILLA MD


REASON: pna


PROCEDURE: CHEST AP ONLY





EXAM: Chest, single view.





HISTORY: Pneumonia.





COMPARISON: 2021





FINDINGS: A frontal view of the chest is obtained. There has been interval 

increase in partially consolidated lateral right upper lobe infiltrate. The 

superimposed on stable diffuse increased interstitial opacity. There is nodular 

opacity overlying the right costophrenic angle due to atelectasis or infiltrate.

 There is stable suspected partially consolidated right middle lobe infiltrate. 

There is a stable cardiac silhouette. There are clips overlying the left upper 

thorax. No pleural effusion or pneumothorax is seen. There is a gastric lap 

band.





IMPRESSION: Increase in suspected partially consolidated lateral right upper 

lobe infiltrate and stable partially consolidated right middle lobe infiltrate 

superimposed on diffuse interstitial infiltrate.





Electronically signed by: Ronel Santos MD (2021 7:29 AM) [id number]














DICTATED AND SIGNED BY:     RONEL SANTOS MD


DATE:     21 0727





CC: ANABEL PADILLA MD; JUHI BAL ~MTH0 0


[]





Heart Score:


C/O Chest Pain:  No


Risk Factors:


Risk Factors:  DM, Current or recent (<one month) smoker, HTN, HLP, family 

history of CAD, obesity.


Risk Scores:


Score 0 - 3:  2.5% MACE over next 6 weeks - Discharge Home


Score 4 - 6:  20.3% MACE over next 6 weeks - Admit for Clinical Observation


Score 7 - 10:  72.7% MACE over next 6 weeks - Early Invasive Strategies





Course & Med Decision Making:


Course & Med Decision Making


Pertinent Labs and Imaging studies reviewed. (See chart for details)





[]





Dragon Disclaimer:


Dragon Disclaimer:


This electronic medical record was generated, in whole or in part, using a voice

 recognition dictation system.





Departure


Departure:


Impression:  


   Primary Impression:  


   Pneumonia


   Additional Impressions:  


   Person under investigation for COVID-19


   Influenza A


Disposition:   HOME / SELF CARE / HOMELESS


Condition:  STABLE


Referrals:  


JUHI BAL (PCP)





Additional Instructions:  


You have been tested for or diagnosed with COVID-19. It is an infection caused 

by a new type 





of coronavirus. COVID-19 will cause cold-like or mild flu symptoms in most. It 

can cause 


more severe symptoms like problems breathing in some.





There is no treatment for COVID-19. The body will clear the infection over time.

 Self-care 


will help to ease discomfort.





Steps to Take:


Self-Care


Rest as needed. Healthy habits may help you feel better. Steps include:





Choose healthy foods including fruits and vegetables. Drink water throughout the

 day.


Get plenty of sleep each night.


If you smoke, try to quit. It may ease breathing.


Avoid alcohol.


Keep Others Healthy


The virus can spread to others. Droplets are released every time you sneeze or 

cough. The 


droplets can get into the mouth, nose, or eyes of people near you and lead to 

infection. To 


lower the chances of spreading COVID-19 to others:





Stay at home until your doctor has said it is safe to leave. If you tested 

positive this 


will mean staying isolated until both of the following are true:





At least 7 days have passed since the start of illness.


You are free of fever for at least 72 hours without the use of medicine.


During this time:





 - Avoid public areas, events, or transportation. Do not return to work or 

school until your 





doctor has said it is safe to do so.


 - Call ahead if you need to go to a medical center. Let them know you may have 

COVID-19. It 





will help them guide you where to go. They may also ask you to wear a facemask 

when you come 





to the office.


 - If you call for emergency medical services, let them know you may have COVID-

19.


While at home:





 - Try to avoid close contact with others. Stay about 6 feet away.


 - If possible, spend most of your time in a separate room from others.


 - Use a face mask if you will be in close contact with others such as sharing a

 room or 


vehicle.


 - Have someone wipe down common surfaces in the home. Use household  

every day on 


areas like doorknobs, counters, or sinks.


 - Cough or sneeze into a tissue. Throw the tissue away right after use. If a 

tissue is not 


available, cough or sneeze into your elbow.


 - Wash your hands often. Wash them after sneezing or coughing. Use soap and 

water and wash 


for at least 20 seconds. Alcohol based hand  can be used if soap and 

water is not 


available.


 - Do not prepare food for others. Avoid sharing personal items like forks, 

spoons, or 


toothbrushes.


 - Avoid close contact with pets while you are sick. There is no evidence of the

 virus 


passing to pets. This is a safety step until more is known about this virus.


Isolation can be frustrating. Social interaction can help. Keep in touch with 

friends and 


family through phone and tech options. You can still interact with others in 

your home, just 





keep a safe distance of about 6 feet.





Follow-up:


Your doctors office will check in with you to see if there are any changes in 

your health. 


You may be asked to keep track of symptoms to share with them. They will also 

let you know 


when you are clear to be in public again.





Problems to Look Out For:


Contact your doctor if your recovery is not going as you expect. Get emergency 

care if you 


have problems such as:





 - Trouble breathing


 - Nonstop chest pain or pressure


 - Changes in awareness, confusion, or problems waking


 - Lips or face have bluish color


 - Worsening of symptoms


If you think you have an emergency, call for emergency medical services right 

away.





[facility]


Scripts


Prednisone (PREDNISONE) 50 Mg Tablet


1 TAB PO DAILY for steroid, #5 TAB


   Prov: ANABEL PADILLA MD         21 


Amoxicillin/Potassium Clav (AUGMENTIN 875-125 TABLET) 1 Each Tablet


1 TAB PO BID for antibiotic for 5 Days, #10 TAB 0 Refills


   Prov: ANABEL PADILLA MD         21











ANABEL PADILLA MD            2021 07:15

## 2021-11-28 NOTE — RAD
EXAM: Chest, single view.



HISTORY: Pneumonia.



COMPARISON: 11/26/2021



FINDINGS: A frontal view of the chest is obtained. There has been interval increase in partially cons
olidated lateral right upper lobe infiltrate. The superimposed on stable diffuse increased interstiti
al opacity. There is nodular opacity overlying the right costophrenic angle due to atelectasis or inf
iltrate. There is stable suspected partially consolidated right middle lobe infiltrate. There is a st
able cardiac silhouette. There are clips overlying the left upper thorax. No pleural effusion or pneu
mothorax is seen. There is a gastric lap band.



IMPRESSION: Increase in suspected partially consolidated lateral right upper lobe infiltrate and stab
le partially consolidated right middle lobe infiltrate superimposed on diffuse interstitial infiltrat
e.



Electronically signed by: Ronel Del Cid MD (11/28/2021 7:29 AM) [id number]

## 2022-03-02 NOTE — RAD
XR CHEST 2V



History: Pneumonia



Comparison: 11/28/2021



Technique: PA and lateral chest radiographs.



Findings:

There are right midlung nodular opacities and minimal left lower lobe opacities which appear persiste
nt however improved from November comparison. No pleural effusion or pneumothorax. Left chest wall jay
rgical clips and partial left anterior second rib resection. Cardiac mediastinal silhouette and pulmo
nary vasculature are within normal limits. Upper abdominal laparoscopic adjustable gastric band and f
illing port are present in the expected orientation.



Impression: 

1.  Right greater than left lung airspace disease appears persistent however improved from November 2
021 comparison. Recommend following to radiographic resolution with repeat radiographs or CT of the c
hest.



Electronically signed by: Jabari Reagan MD (3/2/2022 12:18 PM) [id number]

## 2022-03-13 NOTE — PHYS DOC
Past History


Past Medical History:  GERD, Hypothyroid


Past Surgical History:  , Hysterectomy


Additional Past Surgical Histo:  LAP BAND, BREAST REDUCTION


Alcohol Use:  None


Drug Use:  None





General Adult


HPI:


HPI:


".. I having some shortness of breath.. "... "  I was diagnosed with COVID on 

..  been on antibiotics... .. recently change to Levaquin...





Patient is a 61 year old female who presents with above hx and complaints 

dyspnea   patient has history of sleep apnea requiring oxygen 2 L.  Patient 

recently diagnosed with viral pneumonia and started on a Z-Brent.  Patient 

completed Z-Brent and now on Levaquin.  Patient does still smoke.  Patient had 

Moderna x 2, but no buster.  Pt. recent travel from [facility].  .  Pt. still smokes 

but much less.  Pt.  has hx of GERD, Hypothyorid, lap band, breast reduction, 

COPD, and arthritis.  Pt follows with [facility] for care.





Review of Systems:


Review of Systems:


Constitutional: Complains of fever or chills 


Eyes:  Denies change in visual acuity 


HENT:  Denies nasal congestion or sore throat 


Respiratory: Complains of a nonproductive cough and shortness of breath 


Cardiovascular:  Denies chest pain or edema 


GI:  Denies abdominal pain, nausea, vomiting, bloody stools or diarrhea 


: Denies dysuria 


Musculoskeletal:  Denies back pain or joint pain 


Integument:  Denies rash 


Neurologic:  Denies headache, focal weakness or sensory changes 


Endocrine:  Denies polyuria or polydipsia 


Lymphatic:  Denies swollen glands 


Psychiatric:  Denies depression or anxiety





Family History:


Family History:


Son had COVID infection, relatives in [place] had COVID infection





Current Medications:


Current Meds:


See nursing for home meds





Allergies:


Allergies:





Allergies








Coded Allergies Type Severity Reaction Last Updated Verified


 


  No Known Drug Allergies    16 No











Physical Exam:


PE:





Constitutional: Moderate acute distress, non-toxic appearance. []


HENT: Normocephalic, atraumatic, bilateral external ears normal, oropharynx 

moist, no oral exudates, nose swollen turbinates clear rhinorrhea


Eyes: PERRLA, EOMI, conjunctiva normal, no discharge. [] 


Neck: Normal range of motion, no tenderness, supple, no stridor. [] 


Cardiovascular: Tachycardia heart rate regular rhythm, no murmur []


Lungs & Thorax:  Bilateral breath sounds to apex with scattered wheezes on 

auscultation []


Abdomen: Bowel sounds normal, soft, no tenderness, no masses, no pulsatile 

masses.  Old surgery scars


Skin: Warm, dry, no erythema, no rash. [Poor turgor


Back: No tenderness, no CVA tenderness. [] 


Extremities: No tenderness, no cyanosis, no clubbing, ROM intact, no edema.  

Arthritic changes


Neurologic: Alert and oriented X 3, normal motor function, normal sensory 

function, no focal deficits noted. []


Psychologic: Affect anxious judgement normal, mood normal. []





EKG:


EKG:


My interpretation EKG shows a sinus rhythm at 94 bpm.  Does have a wavering 

respiratory baseline.  Time EKG is 0337 hrs. []





Radiology/Procedures:


Radiology/Procedures:


[][facility] [address], [place] [address]


                                 [phone]


                                        


                                 IMAGING REPORT





                                     Signed





PATIENT: JULIENNE COHEN  ACCOUNT: [id number]     MRN#: [id number]


: 1960           LOCATION: ER              AGE: 61


SEX: F                    EXAM DT: 22         ACCESSION#: [id number]


STATUS: REG ER            ORD. PHYSICIAN: ROMMEL RUSSELL MD


REASON: dyspnea, pleuretic, Covid and Flu positive Omni 350 100cc


PROCEDURE: CT ANGIOGRAPHY CHEST





CT angiography chest with contrast





PQRS statement: CT scans at this facility use dose reduction including either 

automated exposure control, iterative reconstructions, and /or weight based 

radiation dosing via mA and kV modification when appropriate to reduce radiation

 dose to as low as reasonably achievable.





HISTORY: Dyspnea, pleuritic chest pain, covid positive, influenza positive.





Contrast: 100 mL Omnipaque 300 intravenous contrast. 3-D MIP projections of the 

arteries were acquired.





COMPARISON: CT chest 2021





FINDINGS: Heart size normal. Gastric banding there is a small hiatal hernia the 

gastroesophageal junction. Aorta is unremarkable. No pulmonary artery emboli. 

There is mediastinal adenopathy and mild right greater than left hilar 

adenopathy which has developed largest lymph nodes measuring up to 1.8 cm. 

Trachea and bronchi are unremarkable. There are numerous bilateral upper lobe 

greater than lower lobe heterogeneous opacities and extensive centrilobular 

nodules which have developed since prior study. Consolidative opacity right 

middle lobe medial segment with air bronchograms could be lobar pneumonia or 

atelectasis. No pleural effusions. Bones are unremarkable.





IMPRESSION:


1. No pulmonary artery emboli.





2. Development of bilateral pulmonary opacities and centrilobular tiny nodules 

indicative of a bilateral multilobar endobronchial infectious/inflammatory 

process most likely pneumonia. Sarcoidosis would be a secondary consideration.





3. Mild mediastinal and hilar adenopathy has developed.





4. Follow-up CT chest imaging in 3 months is advised to document these findings 

result to exclude the possibility of neoplastic adenopathy and/or neoplastic 

pulmonary nodularity.





Electronically signed by: Juan M Lea MD (3/13/2022 5:16 AM) [facility]














DICTATED AND SIGNED BY:     JUAN M LEA MD


DATE:     22 050





CC: ROMMEL RUSSELL MD; JUHI BAL ~MTH0 0


[facility] [address], [place] [address]


                                 [phone]


                                        


                                 IMAGING REPORT





                                     Signed





PATIENT: JULIENNE COHEN  ACCOUNT: [id number]     MRN#: [id number]


: 1960           LOCATION: ER              AGE: 61


SEX: F                    EXAM DT: 22         ACCESSION#: [id number]


STATUS: REG ER            ORD. PHYSICIAN: ROMMEL RUSSELL MD


REASON: Dysnea


PROCEDURE: PORTABLE CHEST 1V





AP chest x-ray





HISTORY: Dyspnea.





COMPARISON: Chest x-ray 2022





FINDINGS: Heart size normal. Densities which could be surgical clips along the 

left hilum and left upper chest again noted. No pneumothorax. No pleural 

effusions. Pulmonary interstitial infiltrates have developed diffusely. Bones 

are unremarkable.





IMPRESSION: Development of diffuse bilateral pulmonary interstitial infiltrates.

 This may represent pulmonary interstitial edema. Atypical infection such as 

viral pneumonia would also be a possibility. No pleural effusions evident.





Electronically signed by: Juan M Lea MD (3/13/2022 4:04 AM) [facility] [place]














DICTATED AND SIGNED BY:     JUAN M LEA MD


DATE:     22 0402





CC: ROMMEL RUSSELL MD; JHUI BAL ~MTH0 0





Heart Score:


C/O Chest Pain:  N/A


HEART Score for Chest Pain:  








HEART Score for Chest Pain Response (Comments) Value


 


History Slighlty/Non-Suspicious 0


 


ECG Normal 0


 


Age < 45 0


 


Risk Factors                            1 or 2 Risk Factors 1


 


Troponin < Normal Limit 0


 


Total  1








Risk Factors:


Risk Factors:  DM, Current or recent (<one month) smoker, HTN, HLP, family 

history of CAD, obesity.


Risk Scores:


Score 0 - 3:  2.5% MACE over next 6 weeks - Discharge Home


Score 4 - 6:  20.3% MACE over next 6 weeks - Admit for Clinical Observation


Score 7 - 10:  72.7% MACE over next 6 weeks - Early Invasive Strategies





Course & Med Decision Making:


Course & Med Decision Making


Pertinent Labs and Imaging studies reviewed. (See chart for details)





Patient self isolate.  Take Tylenol or Profen for discomfort.  Use MDI 2 puffs 4

 times a day.  Follow-up primary care.








Impression:





1. Covid infection - Dx. -  Still + on Rapid today


2.  Influenza A infection





[]





Dragon Disclaimer:


Dragon Disclaimer:


This electronic medical record was generated, in whole or in part, using a voice

 recognition dictation system.





Departure


Departure:


Referrals:  


JUHI BAL (PCP)





Dragon Disclaimer


This chart was dictated in whole or in part using Voice Recognition software in 

a busy, high-work load, and often noisy Emergency Department environment.  It 

may contain unintended and wholly unrecognized errors or omissions.











ROMMEL RUSSELL MD           Mar 13, 2022 01:58

## 2022-03-13 NOTE — EKG
[facility] [address], [place] [address]

Test Date:    2022               Test Time:    03:37:53

Pat Name:     JULIENNE COHEN            Department:   

Patient ID:   [id number]           Room:          

Gender:       F                        Technician:   

:          1960               Requested By: ROMMEL RUSSELL

Order Number: [id number]            Reading MD:     

                                 Measurements

Intervals                              Axis          

Rate:         94                       P:            90

NE:           122                      QRS:          21

QRSD:         84                       T:            35

QT:           336                                    

QTc:          425                                    

                           Interpretive Statements

SINUS RHYTHM

NO SPECIFIC ECG ABNORMALITIES

RI6.01

No previous ECG available for comparison

## 2022-03-13 NOTE — RAD
CT angiography chest with contrast



PQRS statement: CT scans at this facility use dose reduction including either automated exposure cont
rol, iterative reconstructions, and /or weight based radiation dosing via mA and kV modification when
 appropriate to reduce radiation dose to as low as reasonably achievable.



HISTORY: Dyspnea, pleuritic chest pain, covid positive, influenza positive.



Contrast: 100 mL Omnipaque 300 intravenous contrast. 3-D MIP projections of the arteries were acquire
d.



COMPARISON: CT chest September 27, 2021



FINDINGS: Heart size normal. Gastric banding there is a small hiatal hernia the gastroesophageal junc
tion. Aorta is unremarkable. No pulmonary artery emboli. There is mediastinal adenopathy and mild rig
ht greater than left hilar adenopathy which has developed largest lymph nodes measuring up to 1.8 cm.
 Trachea and bronchi are unremarkable. There are numerous bilateral upper lobe greater than lower lob
e heterogeneous opacities and extensive centrilobular nodules which have developed since prior study.
 Consolidative opacity right middle lobe medial segment with air bronchograms could be lobar pneumoni
a or atelectasis. No pleural effusions. Bones are unremarkable.



IMPRESSION:

1. No pulmonary artery emboli.



2. Development of bilateral pulmonary opacities and centrilobular tiny nodules indicative of a bilate
ral multilobar endobronchial infectious/inflammatory process most likely pneumonia. Sarcoidosis would
 be a secondary consideration.



3. Mild mediastinal and hilar adenopathy has developed.



4. Follow-up CT chest imaging in 3 months is advised to document these findings result to exclude the
 possibility of neoplastic adenopathy and/or neoplastic pulmonary nodularity.



Electronically signed by: Marlo Lea MD (3/13/2022 5:16 AM) [facility]

## 2022-03-13 NOTE — RAD
AP chest x-ray



HISTORY: Dyspnea.



COMPARISON: Chest x-ray March 2, 2022



FINDINGS: Heart size normal. Densities which could be surgical clips along the left hilum and left up
per chest again noted. No pneumothorax. No pleural effusions. Pulmonary interstitial infiltrates have
 developed diffusely. Bones are unremarkable.



IMPRESSION: Development of diffuse bilateral pulmonary interstitial infiltrates. This may represent p
ulmonary interstitial edema. Atypical infection such as viral pneumonia would also be a possibility. 
No pleural effusions evident.



Electronically signed by: Marlo Lea MD (3/13/2022 4:04 AM) [facility]

## 2022-12-02 NOTE — RAD
Is This A New Presentation, Or A Follow-Up?: Skin Lesions Right index finger, 3 views, 6/14/2017:



History: Index finger pain and swelling



No acute fracture or dislocation is identified. There is a small periarticular

calcification along the ulnar aspect of the PIP joint. This is probably due to

old trauma. A gouty tophus is less likely considering the lack of adjacent

bony erosion. There is a very tiny periarticular calcification at the DIP

joint.



IMPRESSION:

1. Small periarticular densities as described above.

2. No acute bony abnormality is detected.